# Patient Record
Sex: MALE | NOT HISPANIC OR LATINO | URBAN - METROPOLITAN AREA
[De-identification: names, ages, dates, MRNs, and addresses within clinical notes are randomized per-mention and may not be internally consistent; named-entity substitution may affect disease eponyms.]

---

## 2017-10-30 ENCOUNTER — OFFICE VISIT - HEALTHEAST (OUTPATIENT)
Dept: INTERNAL MEDICINE | Facility: CLINIC | Age: 47
End: 2017-10-30

## 2017-10-30 DIAGNOSIS — E78.00 PURE HYPERCHOLESTEROLEMIA: ICD-10-CM

## 2017-10-30 DIAGNOSIS — Z00.00 ROUTINE GENERAL MEDICAL EXAMINATION AT A HEALTH CARE FACILITY: ICD-10-CM

## 2017-10-30 LAB
CHOLEST SERPL-MCNC: 254 MG/DL
FASTING STATUS PATIENT QL REPORTED: YES
HDLC SERPL-MCNC: 29 MG/DL
LDLC SERPL CALC-MCNC: 191 MG/DL
TRIGL SERPL-MCNC: 170 MG/DL

## 2017-10-30 ASSESSMENT — MIFFLIN-ST. JEOR: SCORE: 1573.85

## 2017-10-31 ENCOUNTER — COMMUNICATION - HEALTHEAST (OUTPATIENT)
Dept: INTERNAL MEDICINE | Facility: CLINIC | Age: 47
End: 2017-10-31

## 2021-05-31 VITALS — BODY MASS INDEX: 27.5 KG/M2 | WEIGHT: 171.1 LBS | HEIGHT: 66 IN

## 2021-06-13 NOTE — PROGRESS NOTES
ASSESSMENT:  1. Routine general medical examination at a health care facility  Stable.  Very healthy.  No medication.  Update labs  - Vitamin D, Total (25-Hydroxy)  - Basic Metabolic Panel    2. Pure hypercholesterolemia  Recheck.  No indication for treatment yet  - Lipid Cascade      PLAN:  Patient Instructions   You are very healthy    Your examination is normal    Update blood tests    Colon cancer screening begins age 50    Prostate cancer screening begins age 50    No shots until age 60--shingles shot          Orders Placed This Encounter   Procedures     Lipid Cascade     Order Specific Question:   Fasting is required?     Answer:   Unknown     Vitamin D, Total (25-Hydroxy)     Basic Metabolic Panel     There are no discontinued medications.    No Follow-up on file.    CHIEF COMPLAINT:  Chief Complaint   Patient presents with     Annual Exam       HISTORY OF PRESENT ILLNESS:  Clay is a 47 y.o. male presenting to the clinic today for annual exam.     Health Maintenance: He declines a flu shot today. He says that he is up to date on immunizations.     REVIEW OF SYSTEMS:   He feels well and denies any health issues. Specifically, he denies heartburn, asthma or other breathing difficulty, foot swelling, or arthritis pains.   All other systems are negative.    PFSH:  He has been in the [place] for 7 years. He denies a family history of prostate cancer. He is single with no children. He denies a history of surgery, heart attack, stroke, diabetes, or hypertension. 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   History   Smoking Status     Never Smoker   Smokeless Tobacco     Not on file       Family History   Problem Relation Age of Onset     Hypertension Mother      Diabetes Mother      No Medical Problems Father      No Medical Problems Sister      No Medical Problems Brother      No Medical Problems Sister      No Medical Problems Sister        Social History     Social History     Marital status: Single     Spouse  "name: N/A     Number of children: N/A     Years of education: N/A     Occupational History     Not on file.     Social History Main Topics     Smoking status: Never Smoker     Smokeless tobacco: Not on file     Alcohol use No     Drug use: Not on file     Sexual activity: Not on file     Other Topics Concern     Not on file     Social History Narrative       No past surgical history on file.    No Known Allergies    Active Ambulatory Problems     Diagnosis Date Noted     Pterygium eye, bilateral 11/05/2015     Allergic conjunctivitis 11/05/2015     Resolved Ambulatory Problems     Diagnosis Date Noted     No Resolved Ambulatory Problems     No Additional Past Medical History       VITALS:  Vitals:    10/30/17 1327   BP: 134/80   Patient Site: Left Arm   Patient Position: Sitting   Cuff Size: Adult Regular   Pulse: 68   Weight: 171 lb 1.6 oz (77.6 kg)   Height: 5' 6\" (1.676 m)     Wt Readings from Last 3 Encounters:   10/30/17 171 lb 1.6 oz (77.6 kg)   11/01/16 168 lb 8 oz (76.4 kg)   11/05/15 162 lb (73.5 kg)     Body mass index is 27.62 kg/(m^2).    PHYSICAL EXAM:  Constitutional:  Reveals an alert, pleasant, healthy-appearing male.  Vitals:  Per nursing notes.  Ears:  Clear.  Oropharynx:  Without posterior nasal drainage or thrush.  Neck:  Supple, thyroid not palpable.  Cardiac:  Regular rate and rhythm without murmurs, rubs, or gallops. Legs without edema. Palpation of the radial pulse regular.  Lungs: Clear.  Respiratory effort normal.  Abdomen:   Bowel sounds positive, nontender, nondistended.  Neither liver nor spleen palpable.  Skin:   Without rash, bruise, or palpable lesions.  Rheumatologic: Normal joints and nails of the hands.  Neurologic:  Cranial nerves II-XII intact.     Psychiatric:  Mood appropriate, memory intact.         ADDITIONAL HISTORY SUMMARIZED (2): Reviewed past physicals of 11/1/16 and 11/5/15.   DECISION TO OBTAIN EXTRA INFORMATION (1): None.   RADIOLOGY TESTS (1): None.  LABS (1): Labs " ordered.   MEDICINE TESTS (1): None.  INDEPENDENT REVIEW (2 each): None.     The visit lasted a total of 8 minutes face to face with the patient. Over 50% of the time was spent counseling and educating the patient about health maintenance.    I, Ravin Jung, am scribing for and in the presence of, Dr. Fuller.    I, Dr. Fuller, personally performed the services described in this documentation, as scribed by Ravin Jung in my presence, and it is both accurate and complete.    MEDICATIONS:  No current outpatient prescriptions on file.     No current facility-administered medications for this visit.        Total data points:3